# Patient Record
Sex: MALE | Race: WHITE
[De-identification: names, ages, dates, MRNs, and addresses within clinical notes are randomized per-mention and may not be internally consistent; named-entity substitution may affect disease eponyms.]

---

## 2020-02-26 ENCOUNTER — HOSPITAL ENCOUNTER (EMERGENCY)
Dept: HOSPITAL 74 - JER | Age: 30
Discharge: HOME | End: 2020-02-26
Payer: COMMERCIAL

## 2020-02-26 VITALS — SYSTOLIC BLOOD PRESSURE: 124 MMHG | DIASTOLIC BLOOD PRESSURE: 73 MMHG | HEART RATE: 79 BPM

## 2020-02-26 VITALS — BODY MASS INDEX: 30.7 KG/M2

## 2020-02-26 VITALS — TEMPERATURE: 98.7 F

## 2020-02-26 DIAGNOSIS — B34.9: Primary | ICD-10-CM

## 2020-02-26 DIAGNOSIS — Z72.0: ICD-10-CM

## 2020-02-26 DIAGNOSIS — Z88.8: ICD-10-CM

## 2020-02-26 LAB
ALBUMIN SERPL-MCNC: 3.9 G/DL (ref 3.4–5)
ALP SERPL-CCNC: 87 U/L (ref 45–117)
ALT SERPL-CCNC: 71 U/L (ref 13–61)
ANION GAP SERPL CALC-SCNC: 6 MMOL/L (ref 8–16)
AST SERPL-CCNC: 24 U/L (ref 15–37)
BASOPHILS # BLD: 0.6 % (ref 0–2)
BILIRUB SERPL-MCNC: 0.5 MG/DL (ref 0.2–1)
BUN SERPL-MCNC: 16 MG/DL (ref 7–18)
CALCIUM SERPL-MCNC: 8.7 MG/DL (ref 8.5–10.1)
CHLORIDE SERPL-SCNC: 105 MMOL/L (ref 98–107)
CO2 SERPL-SCNC: 26 MMOL/L (ref 21–32)
CREAT SERPL-MCNC: 0.8 MG/DL (ref 0.55–1.3)
DEPRECATED RDW RBC AUTO: 12.5 % (ref 11.9–15.9)
EOSINOPHIL # BLD: 2.1 % (ref 0–4.5)
GLUCOSE SERPL-MCNC: 119 MG/DL (ref 74–106)
HCT VFR BLD CALC: 45.5 % (ref 35.4–49)
HGB BLD-MCNC: 15.1 GM/DL (ref 11.7–16.9)
LYMPHOCYTES # BLD: 12.5 % (ref 8–40)
MCH RBC QN AUTO: 30.7 PG (ref 25.7–33.7)
MCHC RBC AUTO-ENTMCNC: 33.2 G/DL (ref 32–35.9)
MCV RBC: 92.4 FL (ref 80–96)
MONOCYTES # BLD AUTO: 7.4 % (ref 3.8–10.2)
NEUTROPHILS # BLD: 77.4 % (ref 42.8–82.8)
PLATELET # BLD AUTO: 289 K/MM3 (ref 134–434)
PMV BLD: 9.9 FL (ref 7.5–11.1)
POTASSIUM SERPLBLD-SCNC: 4 MMOL/L (ref 3.5–5.1)
PROT SERPL-MCNC: 8.2 G/DL (ref 6.4–8.2)
RBC # BLD AUTO: 4.92 M/MM3 (ref 4–5.6)
SODIUM SERPL-SCNC: 136 MMOL/L (ref 136–145)
WBC # BLD AUTO: 14.2 K/MM3 (ref 4–10)

## 2020-02-26 PROCEDURE — 3E033GC INTRODUCTION OF OTHER THERAPEUTIC SUBSTANCE INTO PERIPHERAL VEIN, PERCUTANEOUS APPROACH: ICD-10-PCS | Performed by: EMERGENCY MEDICINE

## 2020-02-26 NOTE — PDOC
History of Present Illness





- General


Chief Complaint: Nausea/Vomiting


Stated Complaint: ABD PAIN, SORE THROAT, COUGH


Time Seen by Provider: 02/26/20 11:51





- History of Present Illness


Initial Comments: 





02/26/20 12:04





CHIEF COMPLAINT: abd pain, vomiting, diarrhea, URI symptoms





HISTORY OF PRESENT ILLNESS: 31 yo M with hx of gastritis presents to ED with 

URI symptoms, abdominal pain, vomiting, and diarrhea x 2 days.  Patient reports 

that he had flu symptoms 2 weeks ago and the symptoms improved after he 

completed a course of medication, but then 2 days ago he developed a sore throat

, congestion, cough, and since then has had 2 episodes vomiting, and 7 episodes 

of diarrhea.  





No recent travel or sick contacts. 





PAST MEDICAL HISTORY: Denies past medical history





FAMILY HISTORY: Denies





SOCIAL HISTORY: Denies tobacco, alcohol, illicit drug use. 





SURGICAL HISTORY: Denies





ALLERGIES: primaquine





REVIEW OF SYSTEMS


General/Constitutional: Denies fever or chills. Denies weakness, weight change.





HEENT: Denies change in vision. Denies ear pain or discharge. Denies sore 

throat.





Cardiovascular: Denies chest pain or shortness of breath.





Respiratory:  Cough.  Denies wheezing, or hemoptysis.





Gastrointestinal: Vomiting and diarrhea x 2 days.  Denies rectal bleeding.





Genitourinary: Denies dysuria, frequency, or change in urination.





Musculoskeletal: Denies joint or muscle swelling or pain. Denies neck or back 

pain.





Skin and breasts: Denies rash or easy bruising.





Neurologic: Denies headache, vertigo, loss of consciousness, or loss of 

sensation.





Psychiatric: Denies depression or anxiety.





PHYSICAL EXAM


General Appearance: Well-appearing, appropriately dressed.  No apparent 

distress.





HEENT: EOMI, PERRLA, normal ENT inspection, normal voice, TMs normal, pharynx 

normal.  No conjunctival pallor.  No photophobia, scleral icterus.





Neck: Supple.  Trachea midline. No tenderness, rigidity, carotid bruit, stridor

, lymphadenopathy, or thyromegaly. 





Respiratory/Chest: Lungs CTAB.  No shortness of breath, chest tenderness, 

respiratory distress, accessory muscle use. No crackles, rales, rhonchi, stridor

, wheezing, dullness





Cardiovascular: RRR. S1, S2.  No JVD, murmur, bradycardia, tachycardia.





Vascular Pulses: Dorsalis-Pedis (R): 2+, Dorsalis-Pedis (L): 2+





Gastrointestinal/Abdominal: Normal bowel sounds.  Abdomen soft, non-distended.  

No tenderness or rebound tenderness. No  organomegaly, pulsatile mass, guarding

, hernia, hepatomegaly, splenomegaly.





Lymphatic: No adenopathy, tenderness.





Musculoskeletal/Extremities:  Normal inspection. FROM of all extremities, 

normal capillary refill.  Pelvis Stable.  No CVA tenderness. No tenderness to 

extremities, pedal edema, swelling, erythema or deformity.





Integumentary: Appropriate color, dry, warm.  No cyanosis, erythema, jaundice 

or rash





Neurologic: CNs II-XII intact. Fully oriented, alert.  Appropriate mood/affect. 

Motor strength 5/5.  No appreciable EOM palsy, facial droop or sensory deficit.





Past History





- Past Medical History


Allergies/Adverse Reactions: 


 Allergies











Allergy/AdvReac Type Severity Reaction Status Date / Time


 


primaquine Allergy   Verified 02/26/20 11:45











Home Medications: 


Ambulatory Orders





Ibuprofen [Motrin] 400 mg PO QID PRN #10 tablet 03/09/12 


Azithromycin [Zithromax] 250 mg PO DAILY #4 07/10/12 


No Home Medications 0 dose .ROUTE UTDICT 07/10/12 


Benzonatate 100 mg PO TID #20 capsule 02/26/20 


Ondansetron HCl [Zofran] 4 mg PO TID PRN #20 tablet 02/26/20 








COPD: No


GI Disorders: Yes (gastritis)





- Immunization History


Td Vaccination: No





- Psycho Social/Smoking Cessation Hx


Smoking Status: Yes


Smoking History: Never smoked


Number of Cigarettes Smoked Daily: 1


Information on smoking cessation initiated: Yes


Hx Alcohol Use: Yes (occasional)


Drug/Substance Use Hx: No


Substance Use Type: None





*Physical Exam





- Vital Signs


 Last Vital Signs











Temp Pulse Resp BP Pulse Ox


 


 98.7 F   122 H  19   130/78   99 


 


 02/26/20 11:43  02/26/20 11:43  02/26/20 11:43  02/26/20 11:43  02/26/20 11:43














ED Treatment Course





- LABORATORY


CBC & Chemistry Diagram: 


 02/26/20 12:15





 02/26/20 12:15





Medical Decision Making





- Medical Decision Making





02/26/20 13:37


 31 yo M with hx of gastritis presents to ED with URI symptoms, abdominal pain, 

vomiting, and diarrhea x 2 days. 





-labs


-zofran, fluids


-CXR





CXR negative. 





Patient reassessed after administration of meds.  Patient reports symptoms have 

improved.  Will treat symptomatically, dc with close f/u.  





Advised pt of signs and symptoms for return to ER; patient verbalized 

understanding and agrees to plan. 








Discharge





- Discharge Information


Problems reviewed: Yes


Clinical Impression/Diagnosis: 


 Viral syndrome





Condition: Stable


Disposition: HOME





- Admission


No





- Additional Discharge Information


Prescriptions: 


Benzonatate 100 mg PO TID #20 capsule


Ondansetron HCl [Zofran] 4 mg PO TID PRN #20 tablet


 PRN Reason: Nausea And/Or Vomiting





- Follow up/Referral


Referrals: 


Teo Wilson MD [Staff Physician] - 





- Patient Discharge Instructions


Patient Printed Discharge Instructions:  DI for Viral Syndrome





- Post Discharge Activity


Work/Back to School Note:  Back to Work [employer]